# Patient Record
Sex: MALE | NOT HISPANIC OR LATINO
[De-identification: names, ages, dates, MRNs, and addresses within clinical notes are randomized per-mention and may not be internally consistent; named-entity substitution may affect disease eponyms.]

---

## 2022-03-10 ENCOUNTER — APPOINTMENT (OUTPATIENT)
Dept: UROLOGY | Facility: CLINIC | Age: 57
End: 2022-03-10

## 2022-03-10 DIAGNOSIS — N48.6 INDURATION PENIS PLASTICA: ICD-10-CM

## 2022-03-10 NOTE — LETTER BODY
[FreeTextEntry1] : Dear ,\par \par Thank you for referring your patient Itz Castro for consultation for penile curvature.  I have requested several baseline blood studies. I will see the patient back in followup shortly and make further recommendations. I have attached a copy of my consultation note for your records.\par \par Thank you again for this kind referral. I will certainly keep you updated with further progress. Please do not hesitate to call me if you have any questions.\par \par Best regards,\par \par \par \par Dallas Del Rio M.D., PhD\par Professor of Urology\par    [facility] [employer] [facility]\par  for Quality\par Director, Reproductive and Sexual Medicine\par    [facility]

## 2022-03-10 NOTE — HISTORY OF PRESENT ILLNESS
[FreeTextEntry1] : Patient is a 56-year-old gentleman who presents with a chief complaint of penile curvature and erectile dysfunction. He states that this has been present for the past 2 years. It causes both he and his partner great stress.  I reviewed the questionnaire he completed in detail. His erections are not modified with the degree of sexual stimulation.   He states that his erections presently are often less than 5 out of 10 in both tumescence and rigidity, insufficient for penetration.   He often ejaculates through a flaccid phallus.  He has difficulty maintaining an erection. He describes a normal libido.  His sexual dysfunction occurs with both sexual relations and masturbation.  His erections are not improved with PDE5 inhibitors.    His partner is understanding and was unable to be with him at the visit today. He is not  and has adult children.  \par \par The patient denies fevers, chills, nausea and/or vomiting and no unexplained weight loss.  His past medical history is non-contributory.  In his present occupation he has no known toxin exposure. He does not smoke and drinks socially.  He has no known drug allergies. His review of systems and past medical history demonstrates no significant urologic issues (see patient completed questionnaire). His family history demonstrates no significant urologic issues.

## 2025-01-22 ENCOUNTER — NON-APPOINTMENT (OUTPATIENT)
Age: 60
End: 2025-01-22

## 2025-02-06 ENCOUNTER — NON-APPOINTMENT (OUTPATIENT)
Age: 60
End: 2025-02-06